# Patient Record
(demographics unavailable — no encounter records)

---

## 2025-02-26 NOTE — REVIEW OF SYSTEMS
[Fatigue] : fatigue [Cough] : cough [Negative] : Allergic/Immunologic [Fever] : no fever [Chills] : no chills [Night Sweats] : no night sweats [Recent Change In Weight] : ~T no recent weight change

## 2025-02-26 NOTE — ASSESSMENT
[FreeTextEntry1] : 66yo M w/ HTN and scleritis here for newly diagnosed SLL. No B symptoms We reviewed PET/CT results -he has multiple lymph nodes in the neck, chest, abdomen and pelvis as well as several small scattered mesenteric nodules CBC showing mild leukocytosis today but normal ALC - reviewed results w/ pt and wife Had recent CT A/P done while in ED end of 2022 -no significant changes in LAD Wife notes that he's more fatigued. He reports having more constipation; is taking stool softeners now. No fevers, night sweats. Unclear if symptoms related to SLL. We discussed treatment options of BTKi vs immunotherapy. Side effects of both reviewed with pt and wife - we also reviewed indications for treatment. At this time we will continue observing.  RTC 4-6 mo All questions answered.

## 2025-02-26 NOTE — HISTORY OF PRESENT ILLNESS
[de-identified] : 65yo M w/ HTN and scleritis here for newly diagnosed SLL. \par  \par  He saw Dr. Lua (GI) in Oct for bloating and diarrhea. CT A/P done 10/19/21 which noted enlarged aortoiliac nodes, the largest measuring 2.4 x 1.6cm right external iliac.\par  Of note he had imaging done in 2019 after SBO which showed a nonspecific single enlarged right external iliac chain lymph node, measuring 1.3cm in short axis. \par  s/p external iliac LN biopsy on 12/9/21 showing a CD5 positive B-cell lymphoproliferative disorder, consistent with CLL/SLL immunophenotype\par  \par  He denies any weight loss - has gained weight during COVID. No fevers, chills, night sweats. \par   [de-identified] : PET/CT 2/5/22: 1. Multiple small and mildly enlarged lymph nodes in the neck, chest, abdomen, and pelvis, as well as several small scattered mesenteric nodules with and without associated low-grade activity, likely corresponding to known small cell lymphoma/chronic lymphocytic leukemia. 2. Nonspecific heterogeneous FDG activity in the spine without CT correlate, reactive marrow versus tumor involvement. 3. Findings likely representing posttraumatic changes in the left anterior ribs along the costochondral junction. Please correlate with history of trauma. 4. Nonspecific difficult to delineate focal FDG avidity in the right palatine tonsillar region. Disease involvement is not excluded. Please correlate clinically or with direct visualization.  Around Thanksgiving 2022, had a UTI -blood in urine, foul odor. Got Abx and feels better.   He had COVID about 3-4 weeks ago Cardiologist changed his BP meds recently. Notes feeling very fatigued  8/17/2023: Patient is here with his wife. Reports that since his COVID infection in March, he has continued to have a dry cough since. Wife also reports that patient. bouts of tiredness, takes naps. In May he was sleeping very often, too exhausted. He also reported feeling two lumps in his left axilla at that time. He improved within 2 weeks without any additional intervention and the axilla lumps did disappear. He remained symptom free until two weeks ago where the fatigue re-occurred without any additional LAD. He states this lasted 4 days and completely recovered. He also reports that he has had a dry cough since his covid infection that has not improved. Additionally he states loss of appetite, but has been having weight gain.   11/16: Pt has DM, started on Metformin and has lost 32lbs since last visit w/ changes in diet.  Less fatigue, had vertigo x2 but otherwise feeling better.   3/14/24: He will be getting a cardiac test tomorrow for palpitations.  8/14/24: Doing well. Just came back from [place].  2/26/25: Wife notes that he's more fatigued. He reports having more constipation; is taking stool softeners now. No fevers, night sweats.  He is now on Metformin and Atorvastatin.

## 2025-03-03 NOTE — HISTORY OF PRESENT ILLNESS
[FreeTextEntry1] : 67M w CAD htn lymphoma, hx of APC's. presents for f/u Sent in by: Audelia ALVAREZ NP PMD:  wife dirk boone, also my pt  previously, pt seen 4/23 for an initial eval with CP. at site of prev lipoma removal. TTE showing preserved lv systolic function. lvot gradient up to 20s with valsalva. on bp meds. 8/23, feeling well. chest discomfort resolved. BP controlled 2/24, feeling well. coronary cta showing minor plaque. <25% stenosis last seen 9/24, feeling ok, lipitor started.   pt now presents for follow up. today,             pt feeling well overall, denies cp sob dizziness syncope, diaphoresis. agreeable to starting statin, will start lipitor 20  pt having difficulty getting his bp pills form the pharmacy. asking to switch. will change trandolopril-verapamil to lisinopril 20 verapamil 240    pt with lymphoma, not currently on meds.  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 runs a fire district. no tob/etoh/drugs Meds: trandolapril-verapamil 2-240 chlorthalidone 25 pepcid asa Allergies: nkda

## 2025-03-11 NOTE — HISTORY OF PRESENT ILLNESS
[FreeTextEntry1] : 67M w CAD htn lymphoma, hx of APC's. presents for f/u Sent in by: Audelia ALVAREZ NP PMD:  wife dirk boone, also my pt  previously, pt seen 4/23 for an initial eval with CP. at site of prev lipoma removal. TTE showing preserved lv systolic function. lvot gradient up to 20s with valsalva. on bp meds. 8/23, feeling well. chest discomfort resolved. BP controlled 2/24, feeling well. coronary cta showing minor plaque. <25% stenosis last seen 9/24, feeling ok, lipitor started.   pt now presents for follow up. today,  pt feeling well overall, denies cp sob dizziness syncope, diaphoresis.  pt with lymphoma, not currently on meds.  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

## 2025-03-11 NOTE — PHYSICAL EXAM
[Well Developed] : well developed [Well Nourished] : well nourished [No Acute Distress] : no acute distress [Normal Venous Pressure] : normal venous pressure [Normal S1, S2] : normal S1, S2 [Clear Lung Fields] : clear lung fields [Good Air Entry] : good air entry [No Respiratory Distress] : no respiratory distress  [Soft] : abdomen soft [Non Tender] : non-tender [No Masses/organomegaly] : no masses/organomegaly [Normal Gait] : normal gait [No Edema] : no edema [Moves all extremities] : moves all extremities [No Focal Deficits] : no focal deficits [Alert and Oriented] : alert and oriented

## 2025-03-11 NOTE — DISCUSSION/SUMMARY
[FreeTextEntry1] : 67M w htn lymphoma, hx of APC's. presents for f/u  1. CAD -cta reviewed -cont asa, statin  2. HTN -cont current regimen chlorthalidone 25 lisinopril 20 verapamil 240  -pt checks bp at home, states 120s systolic  pt considering a knee replacement, will be in touch, has not met with the surgeon yet.   f/u 6 months unless requires sooner 40 min spent on complete encounter   [EKG obtained to assist in diagnosis and management of assessed problem(s)] : EKG obtained to assist in diagnosis and management of assessed problem(s)

## 2025-06-23 NOTE — REVIEW OF SYSTEMS
[Joint Pain] : joint pain [Joint Stiffness] : joint stiffness [Joint Swelling] : joint swelling [Negative] : Heme/Lymph [FreeTextEntry9] : right knee/ left hip pain

## 2025-06-23 NOTE — HISTORY OF PRESENT ILLNESS
[de-identified] :  Patient is 67-year-old male here for evaluation of right knee pain.  He reports he has had pain in the right knee for 5 years progressively worsening over the past year.  He reports he has developed a bowlegged is affecting the way he walks.  He reports pain with prolonged ambulation exercise and stairs.  He reports pain in the knee is affecting his activities of daily living.  This is causing some left-sided low back pain due to the way he walks as well. Patient has past medical history of CLL/SLL does have follow-up with his oncologist next month currently on observation.  He is also diabetic last A1c was around 6.2.

## 2025-06-23 NOTE — END OF VISIT
[FreeTextEntry3] : Documented by Dena Chapman acting solely as a scribe for Dr. Lucio Allen on this date 06/23/2025.   All Medical record entries made by the Scribe were at my, Dr. Lucio Allen's, direction and personally dictated by me on 06/23/2025. I have reviewed the chart and agree that the record accurately reflects my personal performance of the history, physical exam, assessment and plan. I have also personally directed, reviewed, and agreed with the discharge instructions.

## 2025-06-23 NOTE — PHYSICAL EXAM
[de-identified] :  GENERAL APPEARANCE: Well nourished and hydrated, pleasant, alert, and oriented x 3. Appears their stated age.  HEENT: Normocephalic, extraocular eye motion intact. Nasal septum midline. Oral cavity clear. External auditory canal clear.  RESPIRATORY: Breath sounds clear and audible in all lobes. No wheezing, No accessory muscle use. CARDIOVASCULAR: No apparent abnormalities. No lower leg edema. No varicosities. Pedal pulses are palpable. NEUROLOGIC: Sensation is normal, no muscle weakness in the upper or lower extremities. DERMATOLOGIC: No apparent skin lesions, moist, warm, no rash. SPINE: Cervical spine appears normal and moves freely; thoracic spine appears normal and moves freely; lumbosacral spine appears normal and moves freely, normal, nontender. MUSCULOSKELETAL: Hands, wrists, and elbows are normal and move freely, shoulders are normal and move freely.        [de-identified] : Right knee exam medial joint line tenderness range of motion 0/115.  Varus alignment [de-identified] : 4 view imaging of the right knee shows bone-on-bone medial compartment osteoarthritis with severe varus deformity  Three-view imaging of the left hip shows mild osteoarthritis

## 2025-06-23 NOTE — DISCUSSION/SUMMARY
[Medication Risks Reviewed] : Medication risks reviewed [Surgical risks reviewed] : Surgical risks reviewed [de-identified] : Patient is 67-year-old male here for evaluation of right knee pain.  He does have bone-on-bone medial compartment osteoarthritis of the right knee with severe varus deformity. Patient is a candidate for right knee replacement. The surgery was discussed in detail including pre-op and post-op. The pt is having worse pain with walking, stairs, exercise, getting up from seated position. The pain is limiting his quality of life. The pt has exhausted over 3 months of conservative treatment including home therapy, anti-inflammatories, Tylenol, and activity modification. I believe right TKA is reasonable.  The pt understands his A1c should be below 8.0 prior to surgery. The pt will talk with the surgical coordinator to schedule a right TKA. All questions and concerns were answered.  With regards to his left hip, patient has mild hip osteoarthritis. He is not a candidate for left ALEXEY. He should continue to do low impact exercises.   The patient is a 67 year old individual with end stage arthritis of their right knee joint. The patient has exhausted a minimum of 3 months conservative treatment including prior injections (cortisone and/or hyaluronic acid injections), physical therapy, over the counter NSAIDS and pain medication where indicated. In addition, the patient's quality of life is diminished due to significant chronic pain. The patient is having difficulty with activities of daily living, including ambulating, descending stairs, and rising from a seated position. Based upon the patients continued symptoms and failure to respond to conservative treatment, I have recommended a right total knee replacement for this patient. A long discussion took place with the patient describing what a total joint replacement is and what the procedure would entail. A knee model, similar to the implant that will be used during the operation, was utilized to demonstrate and to discuss the various bearing surfaces of the implants. Implant fixation, use of cement, was also discussed with the patient. Choices of implant manufacturers, mainly [employer] and [employer], were discussed and reviewed with preference to be made by patient and surgeon prior to operation. Final selection to be based on customary practice as well as preoperative templating with selection confirmed intraoperatively based on the patient's anatomy. The patient participated and agreed with the decision-making process. The hospitalization and post-operative care and rehabilitation were also discussed. The use of perioperative antibiotics and DVT prophylaxis were discussed. The risk, benefits and alternatives to a surgical intervention were discussed at length with the patient. The patient was also advised of risks related to the medical comorbidities and elevated body mass index (BMI). A lengthy discussion took place to review the most common complications including but not limited to: deep vein thrombosis, pulmonary embolism, heart attack, stroke, infection, wound breakdown, numbness, damage to nerves, tendon, muscles, arteries or other blood vessels, death and other possible complications from anesthesia. The patient was told that we will take steps to minimize these risks by using sterile technique, antibiotics and DVT prophylaxis when appropriate and follow the patient postoperatively in the office setting to monitor progress. The possibility of recurrent pain, no improvement in pain and actual worsening of pain were also discussed with the patient.   The discharge plan of care focused on the patient going home following surgery. The patient was encouraged to make the necessary arrangements to have someone stay with them when they are discharged home. Following discharge, a home care therapist will visit the patient. The home care therapist will open your home care case and request home physical therapy services. Home physical therapy will commence following discharge provided it is appropriate and covered by the health insurance benefit plan.   The benefits of surgery were discussed with the patient including the potential for improving his/her current clinical condition through operative intervention. Alternatives to surgical intervention including continued conservative management were also discussed in detail. All questions were answered to the satisfaction of the patient. The treatment plan of care, as well as a model of a total knee equivalent to the one that will be used for their total joint replacement, was shared with the patient. The patient participated and agreed to the plan of care as well as the use of the recommended implants for their total joint replacement surgery.

## 2025-07-02 NOTE — ASSESSMENT
[FreeTextEntry1] : 68yo M w/ HTN and scleritis here for newly diagnosed SLL. No B symptoms We reviewed PET/CT results -he has multiple lymph nodes in the neck, chest, abdomen and pelvis as well as several small scattered mesenteric nodules CBC showing mild leukocytosis today but normal ALC - reviewed results w/ pt and wife Had recent CT A/P done while in ED end of 2022 -no significant changes in LAD  At this time we will continue observing.  RTC 4-6 mo All questions answered.

## 2025-07-02 NOTE — HISTORY OF PRESENT ILLNESS
[de-identified] : 63yo M w/ HTN and scleritis here for newly diagnosed SLL. \par  \par  He saw Dr. Lua (GI) in Oct for bloating and diarrhea. CT A/P done 10/19/21 which noted enlarged aortoiliac nodes, the largest measuring 2.4 x 1.6cm right external iliac.\par  Of note he had imaging done in 2019 after SBO which showed a nonspecific single enlarged right external iliac chain lymph node, measuring 1.3cm in short axis. \par  s/p external iliac LN biopsy on 12/9/21 showing a CD5 positive B-cell lymphoproliferative disorder, consistent with CLL/SLL immunophenotype\par  \par  He denies any weight loss - has gained weight during COVID. No fevers, chills, night sweats. \par   [de-identified] : PET/CT 2/5/22: 1. Multiple small and mildly enlarged lymph nodes in the neck, chest, abdomen, and pelvis, as well as several small scattered mesenteric nodules with and without associated low-grade activity, likely corresponding to known small cell lymphoma/chronic lymphocytic leukemia. 2. Nonspecific heterogeneous FDG activity in the spine without CT correlate, reactive marrow versus tumor involvement. 3. Findings likely representing posttraumatic changes in the left anterior ribs along the costochondral junction. Please correlate with history of trauma. 4. Nonspecific difficult to delineate focal FDG avidity in the right palatine tonsillar region. Disease involvement is not excluded. Please correlate clinically or with direct visualization.  Around Thanksgiving 2022, had a UTI -blood in urine, foul odor. Got Abx and feels better.   He had COVID about 3-4 weeks ago Cardiologist changed his BP meds recently. Notes feeling very fatigued  8/17/2023: Patient is here with his wife. Reports that since his COVID infection in March, he has continued to have a dry cough since. Wife also reports that patient. bouts of tiredness, takes naps. In May he was sleeping very often, too exhausted. He also reported feeling two lumps in his left axilla at that time. He improved within 2 weeks without any additional intervention and the axilla lumps did disappear. He remained symptom free until two weeks ago where the fatigue re-occurred without any additional LAD. He states this lasted 4 days and completely recovered. He also reports that he has had a dry cough since his covid infection that has not improved. Additionally he states loss of appetite, but has been having weight gain.   11/16: Pt has DM, started on Metformin and has lost 32lbs since last visit w/ changes in diet.  Less fatigue, had vertigo x2 but otherwise feeling better.   3/14/24: He will be getting a cardiac test tomorrow for palpitations.  8/14/24: Doing well. Just came back from [place].  2/26/25: Wife notes that he's more fatigued. He reports having more constipation; is taking stool softeners now. No fevers, night sweats.  He is now on Metformin and Atorvastatin.  7/2/25: noted having b/l axillary adenopathy, L>R - comes and goes. Fatigue is slightly improved.  TKR scheduled for 8/5/25.

## 2025-07-09 NOTE — HISTORY OF PRESENT ILLNESS
[FreeTextEntry1] : 67M w CAD htn lymphoma, hx of APC's. presents for f/u Sent in by: Audelia ALVAREZ NP PMD:  wife dirk boone, also my pt  previously, pt seen 4/23 for an initial eval with CP. at site of prev lipoma removal. TTE showing preserved lv systolic function. lvot gradient up to 20s with valsalva. on bp meds. 8/23, feeling well. chest discomfort resolved. BP controlled 2/24, feeling well. coronary cta showing minor plaque. <25% stenosis 9/24, feeling ok, lipitor started. last seen 3/25, feeling well   pt now presents for follow up. today,  pt feeling well overall, denies cp sob dizziness syncope, diaphoresis.  pt with lymphoma, not currently on meds.  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

## 2025-07-09 NOTE — DISCUSSION/SUMMARY
[FreeTextEntry1] : 67M w htn lymphoma, hx of APC's. presents for f/u  1. CAD -cta reviewed -cont asa, statin  2. HTN -cont current regimen chlorthalidone 25 lisinopril 20 verapamil 240  -pt checks bp at home, states 120s systolic  3. Pt planning a procedure: R knee replacement with Dr Lucio Allen at [facility] 8/5/25 Pt able to ambulate more than one block without cp/sob (>4 mets) No hx of syncope, VT, VF, SCD No edema, orthopnea No hx of severe valvular lesions pt is intermediate risk for intermediate risk procedure and is optimized from a CV perspective and may proceed without further CV testing  f/u 6 months unless requires sooner 40 min spent on complete encounter   [EKG obtained to assist in diagnosis and management of assessed problem(s)] : EKG obtained to assist in diagnosis and management of assessed problem(s)